# Patient Record
Sex: MALE | Race: BLACK OR AFRICAN AMERICAN | Employment: UNEMPLOYED | URBAN - NONMETROPOLITAN AREA
[De-identification: names, ages, dates, MRNs, and addresses within clinical notes are randomized per-mention and may not be internally consistent; named-entity substitution may affect disease eponyms.]

---

## 2020-05-17 ENCOUNTER — APPOINTMENT (OUTPATIENT)
Dept: GENERAL RADIOLOGY | Age: 30
DRG: 392 | End: 2020-05-17

## 2020-05-17 ENCOUNTER — HOSPITAL ENCOUNTER (INPATIENT)
Age: 30
LOS: 3 days | Discharge: HOME | DRG: 392 | End: 2020-05-20
Attending: EMERGENCY MEDICINE | Admitting: INTERNAL MEDICINE

## 2020-05-17 PROBLEM — R07.9 CHEST PAIN: Status: ACTIVE | Noted: 2020-05-17

## 2020-05-17 LAB
ALBUMIN SERPL-MCNC: 4.8 G/DL (ref 3.5–5.1)
ALP BLD-CCNC: 84 U/L (ref 38–126)
ALT SERPL-CCNC: 197 U/L (ref 11–66)
AMPHETAMINE+METHAMPHETAMINE URINE SCREEN: NEGATIVE
ANION GAP SERPL CALCULATED.3IONS-SCNC: 17 MEQ/L (ref 8–16)
AST SERPL-CCNC: 230 U/L (ref 5–40)
BACTERIA: ABNORMAL /HPF
BARBITURATE QUANTITATIVE URINE: NEGATIVE
BASOPHILS # BLD: 0.5 %
BASOPHILS ABSOLUTE: 0 THOU/MM3 (ref 0–0.1)
BENZODIAZEPINE QUANTITATIVE URINE: NEGATIVE
BILIRUB SERPL-MCNC: 0.6 MG/DL (ref 0.3–1.2)
BILIRUBIN DIRECT: < 0.2 MG/DL (ref 0–0.3)
BILIRUBIN URINE: ABNORMAL
BLOOD, URINE: ABNORMAL
BUN BLDV-MCNC: 8 MG/DL (ref 7–22)
CALCIUM SERPL-MCNC: 11.6 MG/DL (ref 8.5–10.5)
CANNABINOID QUANTITATIVE URINE: POSITIVE
CASTS 2: ABNORMAL /LPF
CASTS UA: ABNORMAL /LPF
CHARACTER, URINE: CLEAR
CHLORIDE BLD-SCNC: 92 MEQ/L (ref 98–111)
CO2: 24 MEQ/L (ref 23–33)
COCAINE METABOLITE QUANTITATIVE URINE: NEGATIVE
COLOR: ABNORMAL
CREAT SERPL-MCNC: 0.8 MG/DL (ref 0.4–1.2)
CRYSTALS, UA: ABNORMAL
D-DIMER QUANTITATIVE: 1006 NG/ML FEU (ref 0–500)
EKG ATRIAL RATE: 70 BPM
EKG ATRIAL RATE: 81 BPM
EKG ATRIAL RATE: 83 BPM
EKG P AXIS: 64 DEGREES
EKG P AXIS: 73 DEGREES
EKG P AXIS: 80 DEGREES
EKG P-R INTERVAL: 160 MS
EKG P-R INTERVAL: 162 MS
EKG P-R INTERVAL: 162 MS
EKG Q-T INTERVAL: 346 MS
EKG Q-T INTERVAL: 362 MS
EKG Q-T INTERVAL: 364 MS
EKG QRS DURATION: 74 MS
EKG QRS DURATION: 80 MS
EKG QRS DURATION: 80 MS
EKG QTC CALCULATION (BAZETT): 393 MS
EKG QTC CALCULATION (BAZETT): 401 MS
EKG QTC CALCULATION (BAZETT): 425 MS
EKG R AXIS: 25 DEGREES
EKG R AXIS: 41 DEGREES
EKG R AXIS: 46 DEGREES
EKG T AXIS: -3 DEGREES
EKG T AXIS: 21 DEGREES
EKG T AXIS: 6 DEGREES
EKG VENTRICULAR RATE: 70 BPM
EKG VENTRICULAR RATE: 81 BPM
EKG VENTRICULAR RATE: 83 BPM
EOSINOPHIL # BLD: 0.2 %
EOSINOPHILS ABSOLUTE: 0 THOU/MM3 (ref 0–0.4)
EPITHELIAL CELLS, UA: ABNORMAL /HPF
ERYTHROCYTE [DISTWIDTH] IN BLOOD BY AUTOMATED COUNT: 13 % (ref 11.5–14.5)
ERYTHROCYTE [DISTWIDTH] IN BLOOD BY AUTOMATED COUNT: 41.5 FL (ref 35–45)
GFR SERPL CREATININE-BSD FRML MDRD: > 90 ML/MIN/1.73M2
GLUCOSE BLD-MCNC: 99 MG/DL (ref 70–108)
GLUCOSE URINE: ABNORMAL MG/DL
HAV IGM SER IA-ACNC: NEGATIVE
HCT VFR BLD CALC: 39.7 % (ref 42–52)
HEMOGLOBIN: 13.1 GM/DL (ref 14–18)
HEPATITIS B CORE IGM ANTIBODY: NEGATIVE
HEPATITIS B SURFACE ANTIGEN: NEGATIVE
HEPATITIS C ANTIBODY: NEGATIVE
ICTOTEST: NEGATIVE
IMMATURE GRANS (ABS): 0.03 THOU/MM3 (ref 0–0.07)
IMMATURE GRANULOCYTES: 0.5 %
KETONES, URINE: ABNORMAL
LEUKOCYTE ESTERASE, URINE: ABNORMAL
LIPASE: 24.7 U/L (ref 5.6–51.3)
LYMPHOCYTES # BLD: 14.8 %
LYMPHOCYTES ABSOLUTE: 0.9 THOU/MM3 (ref 1–4.8)
MCH RBC QN AUTO: 29.2 PG (ref 26–33)
MCHC RBC AUTO-ENTMCNC: 33 GM/DL (ref 32.2–35.5)
MCV RBC AUTO: 88.6 FL (ref 80–94)
MISCELLANEOUS 2: ABNORMAL
MONOCYTES # BLD: 14.8 %
MONOCYTES ABSOLUTE: 0.9 THOU/MM3 (ref 0.4–1.3)
NITRITE, URINE: ABNORMAL
NUCLEATED RED BLOOD CELLS: 0 /100 WBC
OPIATES, URINE: NEGATIVE
OSMOLALITY CALCULATION: 264.7 MOSMOL/KG (ref 275–300)
OXYCODONE: NEGATIVE
PH UA: ABNORMAL (ref 5–9)
PHENCYCLIDINE QUANTITATIVE URINE: NEGATIVE
PLATELET # BLD: 112 THOU/MM3 (ref 130–400)
PMV BLD AUTO: 10.7 FL (ref 9.4–12.4)
POTASSIUM REFLEX MAGNESIUM: 4 MEQ/L (ref 3.5–5.2)
PRO-BNP: 17.7 PG/ML (ref 0–450)
PROTEIN UA: ABNORMAL
RBC # BLD: 4.48 MILL/MM3 (ref 4.7–6.1)
RBC URINE: ABNORMAL /HPF
RENAL EPITHELIAL, UA: ABNORMAL
SEG NEUTROPHILS: 69.2 %
SEGMENTED NEUTROPHILS ABSOLUTE COUNT: 4 THOU/MM3 (ref 1.8–7.7)
SODIUM BLD-SCNC: 133 MEQ/L (ref 135–145)
SPECIFIC GRAVITY, URINE: ABNORMAL (ref 1–1.03)
TOTAL PROTEIN: 8.2 G/DL (ref 6.1–8)
TROPONIN T: < 0.01 NG/ML
UROBILINOGEN, URINE: ABNORMAL EU/DL (ref 0–1)
WBC # BLD: 5.8 THOU/MM3 (ref 4.8–10.8)
WBC UA: ABNORMAL /HPF
YEAST: ABNORMAL

## 2020-05-17 PROCEDURE — B2111ZZ FLUOROSCOPY OF MULTIPLE CORONARY ARTERIES USING LOW OSMOLAR CONTRAST: ICD-10-PCS | Performed by: INTERNAL MEDICINE

## 2020-05-17 PROCEDURE — 85025 COMPLETE CBC W/AUTO DIFF WBC: CPT

## 2020-05-17 PROCEDURE — 6370000000 HC RX 637 (ALT 250 FOR IP): Performed by: EMERGENCY MEDICINE

## 2020-05-17 PROCEDURE — 99283 EMERGENCY DEPT VISIT LOW MDM: CPT

## 2020-05-17 PROCEDURE — 36000 PLACE NEEDLE IN VEIN: CPT

## 2020-05-17 PROCEDURE — 93010 ELECTROCARDIOGRAM REPORT: CPT | Performed by: INTERNAL MEDICINE

## 2020-05-17 PROCEDURE — 83690 ASSAY OF LIPASE: CPT

## 2020-05-17 PROCEDURE — 4A023N7 MEASUREMENT OF CARDIAC SAMPLING AND PRESSURE, LEFT HEART, PERCUTANEOUS APPROACH: ICD-10-PCS | Performed by: INTERNAL MEDICINE

## 2020-05-17 PROCEDURE — 80074 ACUTE HEPATITIS PANEL: CPT

## 2020-05-17 PROCEDURE — 80048 BASIC METABOLIC PNL TOTAL CA: CPT

## 2020-05-17 PROCEDURE — 93458 L HRT ARTERY/VENTRICLE ANGIO: CPT

## 2020-05-17 PROCEDURE — 1200000003 HC TELEMETRY R&B

## 2020-05-17 PROCEDURE — 36415 COLL VENOUS BLD VENIPUNCTURE: CPT

## 2020-05-17 PROCEDURE — B2151ZZ FLUOROSCOPY OF LEFT HEART USING LOW OSMOLAR CONTRAST: ICD-10-PCS | Performed by: INTERNAL MEDICINE

## 2020-05-17 PROCEDURE — 2500000003 HC RX 250 WO HCPCS

## 2020-05-17 PROCEDURE — 93005 ELECTROCARDIOGRAM TRACING: CPT | Performed by: EMERGENCY MEDICINE

## 2020-05-17 PROCEDURE — 6360000004 HC RX CONTRAST MEDICATION: Performed by: INTERNAL MEDICINE

## 2020-05-17 PROCEDURE — 93458 L HRT ARTERY/VENTRICLE ANGIO: CPT | Performed by: INTERNAL MEDICINE

## 2020-05-17 PROCEDURE — 2580000003 HC RX 258: Performed by: INTERNAL MEDICINE

## 2020-05-17 PROCEDURE — 80307 DRUG TEST PRSMV CHEM ANLYZR: CPT

## 2020-05-17 PROCEDURE — 99223 1ST HOSP IP/OBS HIGH 75: CPT | Performed by: INTERNAL MEDICINE

## 2020-05-17 PROCEDURE — 81001 URINALYSIS AUTO W/SCOPE: CPT

## 2020-05-17 PROCEDURE — 99205 OFFICE O/P NEW HI 60 MIN: CPT

## 2020-05-17 PROCEDURE — 2709999900 HC NON-CHARGEABLE SUPPLY

## 2020-05-17 PROCEDURE — 83880 ASSAY OF NATRIURETIC PEPTIDE: CPT

## 2020-05-17 PROCEDURE — 6360000002 HC RX W HCPCS

## 2020-05-17 PROCEDURE — 85379 FIBRIN DEGRADATION QUANT: CPT

## 2020-05-17 PROCEDURE — 6370000000 HC RX 637 (ALT 250 FOR IP): Performed by: NURSE PRACTITIONER

## 2020-05-17 PROCEDURE — C1894 INTRO/SHEATH, NON-LASER: HCPCS

## 2020-05-17 PROCEDURE — 80076 HEPATIC FUNCTION PANEL: CPT

## 2020-05-17 PROCEDURE — 93005 ELECTROCARDIOGRAM TRACING: CPT | Performed by: INTERNAL MEDICINE

## 2020-05-17 PROCEDURE — 71045 X-RAY EXAM CHEST 1 VIEW: CPT

## 2020-05-17 PROCEDURE — 93005 ELECTROCARDIOGRAM TRACING: CPT | Performed by: NURSE PRACTITIONER

## 2020-05-17 PROCEDURE — 84484 ASSAY OF TROPONIN QUANT: CPT

## 2020-05-17 PROCEDURE — C1769 GUIDE WIRE: HCPCS

## 2020-05-17 PROCEDURE — 6370000000 HC RX 637 (ALT 250 FOR IP): Performed by: INTERNAL MEDICINE

## 2020-05-17 RX ORDER — SODIUM CHLORIDE 0.9 % (FLUSH) 0.9 %
10 SYRINGE (ML) INJECTION EVERY 12 HOURS SCHEDULED
Status: DISCONTINUED | OUTPATIENT
Start: 2020-05-17 | End: 2020-05-20 | Stop reason: HOSPADM

## 2020-05-17 RX ORDER — PANTOPRAZOLE SODIUM 40 MG/1
40 TABLET, DELAYED RELEASE ORAL
Status: DISCONTINUED | OUTPATIENT
Start: 2020-05-17 | End: 2020-05-20 | Stop reason: HOSPADM

## 2020-05-17 RX ORDER — SODIUM CHLORIDE 9 MG/ML
INJECTION, SOLUTION INTRAVENOUS CONTINUOUS
Status: DISCONTINUED | OUTPATIENT
Start: 2020-05-17 | End: 2020-05-18

## 2020-05-17 RX ORDER — SODIUM CHLORIDE 0.9 % (FLUSH) 0.9 %
10 SYRINGE (ML) INJECTION PRN
Status: DISCONTINUED | OUTPATIENT
Start: 2020-05-17 | End: 2020-05-20 | Stop reason: HOSPADM

## 2020-05-17 RX ORDER — ACETAMINOPHEN 325 MG/1
650 TABLET ORAL EVERY 4 HOURS PRN
Status: DISCONTINUED | OUTPATIENT
Start: 2020-05-17 | End: 2020-05-20 | Stop reason: HOSPADM

## 2020-05-17 RX ORDER — ASPIRIN 81 MG/1
324 TABLET, CHEWABLE ORAL ONCE
Status: COMPLETED | OUTPATIENT
Start: 2020-05-17 | End: 2020-05-17

## 2020-05-17 RX ORDER — ASPIRIN 81 MG/1
81 TABLET ORAL ONCE
Status: COMPLETED | OUTPATIENT
Start: 2020-05-17 | End: 2020-05-17

## 2020-05-17 RX ADMIN — PANTOPRAZOLE SODIUM 40 MG: 40 TABLET, DELAYED RELEASE ORAL at 17:11

## 2020-05-17 RX ADMIN — LIDOCAINE HYDROCHLORIDE: 20 SOLUTION ORAL; TOPICAL at 14:29

## 2020-05-17 RX ADMIN — ASPIRIN 81 MG 162 MG: 81 TABLET ORAL at 13:43

## 2020-05-17 RX ADMIN — IOPAMIDOL 60 ML: 755 INJECTION, SOLUTION INTRAVENOUS at 15:36

## 2020-05-17 RX ADMIN — ASPIRIN 81 MG: 81 TABLET ORAL at 17:11

## 2020-05-17 RX ADMIN — Medication 10 ML: at 20:44

## 2020-05-17 RX ADMIN — SODIUM CHLORIDE: 9 INJECTION, SOLUTION INTRAVENOUS at 17:11

## 2020-05-17 RX ADMIN — ACETAMINOPHEN 650 MG: 325 TABLET ORAL at 20:44

## 2020-05-17 ASSESSMENT — ENCOUNTER SYMPTOMS
EYE REDNESS: 0
EYE PAIN: 0
NAUSEA: 0
ROS SKIN COMMENTS: DIAPHORETIC
CHEST TIGHTNESS: 0
SORE THROAT: 0
RHINORRHEA: 0
ABDOMINAL PAIN: 0
DIARRHEA: 0
SHORTNESS OF BREATH: 0
VOICE CHANGE: 0
FACIAL SWELLING: 0
ALLERGIC/IMMUNOLOGIC NEGATIVE: 1
COLOR CHANGE: 0
PHOTOPHOBIA: 0
SINUS PRESSURE: 0
VOMITING: 0
COUGH: 0
BACK PAIN: 0
STRIDOR: 0
SHORTNESS OF BREATH: 1
SINUS PAIN: 0
WHEEZING: 0

## 2020-05-17 ASSESSMENT — PAIN DESCRIPTION - LOCATION
LOCATION: CHEST

## 2020-05-17 ASSESSMENT — PAIN SCALES - GENERAL
PAINLEVEL_OUTOF10: 0
PAINLEVEL_OUTOF10: 5
PAINLEVEL_OUTOF10: 4

## 2020-05-17 ASSESSMENT — PAIN DESCRIPTION - PAIN TYPE
TYPE: ACUTE PAIN

## 2020-05-17 ASSESSMENT — PAIN DESCRIPTION - FREQUENCY
FREQUENCY: INTERMITTENT
FREQUENCY: INTERMITTENT

## 2020-05-17 NOTE — ED PROVIDER NOTES
TempSrc:  Oral  Oral   SpO2: 97% 98%  98%   Weight:   175 lb (79.4 kg)    Height:   6' 4\" (1.93 m)        Medications   sodium chloride flush 0.9 % injection 10 mL (has no administration in time range)   sodium chloride flush 0.9 % injection 10 mL (has no administration in time range)   acetaminophen (TYLENOL) tablet 650 mg (has no administration in time range)   magnesium hydroxide (MILK OF MAGNESIA) 400 MG/5ML suspension 30 mL (has no administration in time range)   0.9 % sodium chloride infusion ( Intravenous New Bag 5/17/20 1711)   pantoprazole (PROTONIX) tablet 40 mg (40 mg Oral Given 5/17/20 1711)   aspirin chewable tablet 324 mg (162 mg Oral Given 5/17/20 1343)   aluminum & magnesium hydroxide-simethicone (MAALOX) 30 mL, lidocaine viscous hcl (XYLOCAINE) 5 mL (GI COCKTAIL) ( Oral Given 5/17/20 1429)   iopamidol (ISOVUE-370) 76 % injection 60 mL (60 mLs Intravenous Given 5/17/20 1536)   aspirin EC tablet 81 mg (81 mg Oral Given 5/17/20 1711)   given 2 tabs during urgent care 162 mg   PROCEDURES:  FINALIMPRESSION      1. Chest pain, unspecified type    2. EKG abnormality    3. Elevated blood pressure reading        DISPOSITION/PLAN   DISPOSITION Admitted 05/17/2020 04:06:29 PM   Faxed 12 lead EKG to [facility], notified slight elevation in leads V3, possible V4. Pt is symptomatic. Started chest pain protocol, pulse ox 97%, room air, no oxygen applied at this time. [facility] called for chest pain for transfer. Patient is currently having chest pressure left-sided chest onset was 2 days ago the pain has been intermittent. The pain is mild 2 out of 10. He denies any shortness of breath, dizziness, headache, visual disturbance, nausea or vomiting, abdominal pain or back pain. No recent injury. EKG shows sinus rhythm with marked sinus arrhythmia possible left atrial enlargement and septal infarct, age undetermined. There is slight elevation in leads V3 and V4.   Further diagnostic work-up is needed that we are not able to do in the urgent care setting. Higher level of care needed. Spoke to Dr. José Antonio Bonilla at [address] [place] [address] ER faxed over EKG for review. After reviewing the patients History of Present illness, Vital Signs,Clinical Findings,Comorbities, and Clinical Data obtained I discussed with the patient or patient representative that there is a very real potential for serious injury / illness and the patient will need to be transfer to a level of higher care for further evaluation and continued care. It was explained that this would provide the best care for the patient. The patient/patient representative are agreeable to the treatment plan and are agreeable to be transferred therefore, the patient will be transferred to [facility] ER for reevaluation and further management . Report was called to the accepting facility and given to Dr. José Antonio Bonilla who accepted the patients transfer. Patient was transferred from the Urgent Care in stable condition by [facility] . ED Course as of May 17 1759   Sun May 17, 2020   1422 Concave 2mm anterior, appears early repol   EKG 12 Lead [DD]   1438 Dr. Valdemar Dolan at bedside as STEMI alert was called per inbound providers.  States eqivocal ECG and states will take to cath lab    [DD]      ED Course User Index  [DD] Stephany Davison DO       Problem List Items Addressed This Visit     Chest pain - Primary      Other Visit Diagnoses     EKG abnormality        Elevated blood pressure reading              PATIENT REFERRED TO:  [facility] ER  [address] [address]  Go to   go directly over for chest pain via [address], APRN - CNP  05/17/20 [address], APRN - CNP  05/17/20 1800

## 2020-05-17 NOTE — FLOWSHEET NOTE
05/17/20 1426   Encounter Summary   Services provided to: Patient   Referral/Consult From: Nurse   Support System Family members   Place [facility] none   Continue Visiting Yes  (5/17/20 continue support)   Complexity of Encounter Moderate   Length of Encounter 30 minutes   Spiritual Assessment Completed Yes   Crisis   Type Stemi Alert   Assessment Approachable;Calm;Coping   Intervention Active listening;Nurtured hope;Discussed illness/injury and it's impact   Outcome Expressed gratitude;Engaged in conversation   Spiritual/Synagogue   Type Spiritual support     Pt is a 26year old male. Patient is resting in bed awake and engaged in conversation during this visit. Patient shared that he was experiencing chest pain and restlessness at home. Patient also shared with me that he has not experienced this kind of chest pain before. Also during this visit, patient shared that he has has not been able to sleep well for several days. Assessment:  Patient appeared calm and alert. Patient has no family with him. Patient is approachable end engaged in conversation. Patient has no [employer]an affiliation and is receptive to 's visit. Plan of Care:   will follow up with patient in a day to talk about patient source of meanings and purpose in life  Secondly,  will engaged patient to learn further details about patient's tony and learn how patient's tony is playing a role through his health care needs.  SC service remain available to patient while patient is receiving care here at [address] [place] [address]

## 2020-05-17 NOTE — ED TRIAGE NOTES
Patient states he has left sided stabbing chest pain. Patient has had pain come and go for 3 days. Patient states he is very sweaty. Patient states he had a brother at 22 have a heart attack.

## 2020-05-17 NOTE — ED NOTES
Upon arrival, pt is A&O x4. Pt currently states pain 4/10 in middle-left side of chest. Pt states pains started 3-4 days ago and progressively got worse. Describes pain as \"crampy and stingy. \" Pt states that he also is having episodes of heartburn, mainly after eating. Pt states he also has had chills, SOB with pain exacerbation, but no reports of fever over last few days. No changes in bowel and bladder habits    Pt states he was given aspirin at urgent care and it helped diminish pain. EMS states pt was given 0.4 mg on nitro enroute that decreased pain from 6 to 4/10.      Pt has a hx of MI in family      Lourena Simmonds, SUSANNE  05/17/20 5076

## 2020-05-17 NOTE — ED NOTES
Patient left with [facility] in ambulance to go directly to [facility] ER. Patient had no other needs.       Addison Holliday RN  05/17/20 8074

## 2020-05-17 NOTE — ED PROVIDER NOTES
urgency. Musculoskeletal: Negative for arthralgias, back pain, myalgias and neck pain. Skin: Negative for color change and rash. Neurological: Negative for weakness and headaches. Hematological: Negative for adenopathy. Does not bruise/bleed easily. Psychiatric/Behavioral: Negative for confusion and self-injury. PAST MEDICAL HISTORY    has no past medical history on file. SURGICAL HISTORY      has no past surgical history on file. CURRENT MEDICATIONS       Previous Medications    ASPIRIN 81 MG TABLET    Take 162 mg by mouth daily Patient took this morning       ALLERGIES     has No Known Allergies. FAMILY HISTORY     He indicated that the status of his brother is unknown.   family history includes Heart Attack in his brother. SOCIAL HISTORY      reports that he has been smoking cigarettes. He has been smoking about 0.50 packs per day. He has never used smokeless tobacco. He reports current alcohol use. He reports that he does not use drugs. PHYSICAL EXAM     INITIAL VITALS:  height is 6' 4\" (1.93 m) and weight is 175 lb (79.4 kg). His temperature is 97.7 °F (36.5 °C). His blood pressure is 158/101 (abnormal) and his pulse is 95. His respiration is 18 and oxygen saturation is 97%. Physical Exam  Vitals signs and nursing note reviewed. Constitutional:       General: He is not in acute distress. Appearance: He is well-developed. He is not diaphoretic. HENT:      Head: Normocephalic and atraumatic. Eyes:      Conjunctiva/sclera: Conjunctivae normal.   Neck:      Musculoskeletal: Normal range of motion and neck supple. Thyroid: No thyromegaly. Cardiovascular:      Rate and Rhythm: Normal rate and regular rhythm. Heart sounds: Normal heart sounds. No murmur. Pulmonary:      Effort: Pulmonary effort is normal. No respiratory distress. Breath sounds: Normal breath sounds. No decreased breath sounds, wheezing or rhonchi. Chest:      Chest wall: No tenderness. Abdominal:      General: Bowel sounds are normal. There is no distension. Palpations: Abdomen is soft. Tenderness: There is abdominal tenderness (epigastric). Musculoskeletal: Normal range of motion. Skin:     General: Skin is warm and dry. Coloration: Skin is not pale. Findings: No erythema or rash. Neurological:      Mental Status: He is alert and oriented to person, place, and time. Psychiatric:         Behavior: Behavior normal.         Thought Content: Thought content normal.         Judgment: Judgment normal.         DIFFERENTIALDIAGNOSIS:   GI related, ? ETOH pancreatitis, hepatitis, ECG     DIAGNOSTIC RESULTS     EKG: All EKG's are interpreted by the Emergency Department Physician who either signs or Co-signs this chart in the absence of a cardiologist.  EKG interpreted by Breanna Vick DO:    Hilda. Rate: 83 bpm  WA interval: 162 ms  QRS duration: 80 ms  QTc: 425 ms  P-R-T axes: 80, 41, -3  Sinus rhythm with marked sinus arrhythmia  Possible Left atrial enlargement  Septal infarct , age undetermined  No STEMI  Early repolarization and anterior lateral leads T wave inversion isolated lead 3    RADIOLOGY: non-plain film images(s) such as CT, Ultrasound and MRI are read by the radiologist.    XR CHEST PORTABLE   Final Result   1. Unremarkable AP portable chest radiograph. **This report has been created using voice recognition software. It may contain minor errors which are inherent in voice recognition technology. **      Final report electronically signed by Dr. Chari Martinez on 5/17/2020 2:46 PM          LABS:   Labs Reviewed - No data to display    EMERGENCYDEPARTMENT COURSE:   Vitals:    Vitals:    05/17/20 1333 05/17/20 1350   BP: (!) 189/111 (!) 158/101   Pulse: 112 95   Resp: 18 18   Temp: 97.7 °F (36.5 °C)    SpO2: 97% 97%   Weight: 175 lb (79.4 kg)    Height: 6' 4\" (1.93 m)        2:15 PM EDT: The patient was seen and evaluated.     2:18 PM EDT: Spoke with Cardiology about the patient. ED Course as of May 17 1811   Sun May 17, 2020   1422 Concave 2mm anterior, appears early repol   EKG 12 Lead [DD]   1438 Dr. Susi Lopez at bedside as STEMI alert was called per inbound providers. States eqivocal ECG and states will take to cath lab    [DD]      ED Course User Index  [DD] Zahra Goff DO         MDM:  68-year-old male with risk factors for coronary artery disease smoking, family history of early MI in his brother. Patient's pain? Cardiac versus GI given reported alcohol use. Patient does not appear in acute distress. PERC scores negative. Abdomen is soft with minimal epigastric tenderness. The report was called and inbound and then cancelled per my review. Cardiology was at bedside and reviewed ECG and patient history. Given Risk factors and equivocal ECG- Patient was then taken to Cath lab. CRITICAL CARE:   None    CONSULTS:  Cardiology     PROCEDURES:  None     FINAL IMPRESSION      1. Chest pain, unspecified type    2. EKG abnormality    3. Elevated blood pressure reading          DISPOSITION/PLAN   admit    PATIENT REFERRED TO:  [facility]  [address] [facility] [address]  Go to   go directly over for chest pain via [address] [facility]:  New Prescriptions    No medications on file       (Please note that portions of this note were completed with a voice recognition program.  Efforts were made to edit the dictations but occasionally words aremis-transcribed.)    Scribe:  Shaheen Davidson 5/17/20 2:18 PM EDT Scribing for and in the presence of Zahra Goff DO. Signed by: Malinda Marquez, 05/17/20 2:18 PM    Provider:  I personally performed theservices described in the documentation, reviewed and edited the documentation which was dictated to the scribe in my presence, and it accurately records my words and actions.     Zahra Goff DO 5/17/20 2:18 PM        Zahra Goff DO  05/17/20 1816

## 2020-05-17 NOTE — ED NOTES
Patient already had 2 mike asa this am so 2 more were given per AHA protocol     Shannon Pichardo, RN  05/17/20 0881

## 2020-05-17 NOTE — PRE SEDATION
questions and wished to proceed; the consent form was signed. MEDICAL HISTORY  []ASHD/ANGINA/MI/CHF   []Hypertension  []Diabetes  []Hyperlipidemia  []Smoking  []Family Hx of ASHD  []Additional information:       has no past medical history on file. SURGICAL HISTORY   has no past surgical history on file. Additional information:       ALLERGIES   Allergies as of 05/17/2020    (No Known Allergies)     Additional information:       MEDICATIONS   Aspirin  [x] 81 mg  [] 325 mg  [] None  Antiplatelet drug therapy use last 5 days  [x]No []Yes  Coumadin Use Last 5 Days [x]No []Yes  Other anticoagulant use last 5 days  [x]No []Yes  No current facility-administered medications for this encounter. Current Outpatient Medications:     aspirin 81 MG tablet, Take 162 mg by mouth daily Patient took this morning, Disp: , Rfl:   Prior to Admission medications    Medication Sig Start Date End Date Taking? Authorizing Provider   aspirin 81 MG tablet Take 162 mg by mouth daily Patient took this morning   Yes Historical Provider, MD     Additional information:       VITAL SIGNS   Vitals:    05/17/20 1350   BP: (!) 158/101   Pulse: 95   Resp: 18   Temp:    SpO2: 97%       PHYSICAL:   General: No acute distress  HEENT:  Unremarkable for age  Neck: without increased JVD, carotid pulses 2+ bilaterally without bruits  Heart: RRR, S1 & S2 WNL.  No murmurs   NYHA: IV   Lungs: Clear to auscultation    Abdomen: BS present, without HSM, masses, or tenderness    Extremities: without C,C,E.  Pulses 2+ bilaterally   Mental Status: Alert & Oriented        PLANNED PROCEDURE   [x]Cath  [x]PCI                []Pacemaker/AICD  []AIDA             []Cardioversion []Peripheral angiography/PTA  []Other:      SEDATION  Planned agent:[]Midazolam []Meperidine []Sublimaze []Morphine  []Diazepam  []Other:     ASA Classification:  []1 []2 [x]3 []4 []5   Class 1: A normal healthy patient  Class 2: Pt with mild to moderate systemic disease  Class 3:

## 2020-05-17 NOTE — H&P
lymphadenopathy  Pulmonary/Chest: clear to auscultation bilaterally- no wheezes, rales or rhonchi, normal air movement, no respiratory distress  Cardiovascular: normal rate, regular rhythm, normal S1 and S2, no murmur. No rubs, clicks, or gallops, distal pulses intact, no carotid bruits, Negative JVD  Radial Pulses: intact 2+  Abdomen: soft, non-tender, non-distended, normal bowel sounds, no masses or organomegaly  Extremities: no cyanosis, clubbing . no Edema  Musculoskeletal: normal range of motion, no joint swelling, deformity or tenderness      RADIOLOGY   No results found. LABS:  No results for input(s): CKTOTAL, CKMB, CKMBINDEX, TROPONINT in the last 72 hours. CBC: No results found for: WBC, RBC, HGB, HCT, MCV, MCH, MCHC, RDW, PLT, MPV  BMP:  No results found for: NA, K, CL, CO2, BUN, LABALBU, CREATININE, CALCIUM, GFRAA, LABGLOM, GLUCOSE  Hepatic Function Panel:  No results found for: ALKPHOS, ALT, AST, PROT, BILITOT, BILIDIR, IBILI, LABALBU  Magnesium:  No results found for: MG  Warfarin PT/INR:  No components found for: PTPATWAR, PTINRWAR  HgBA1c:  No results found for: LABA1C  FLP:  No results found for: TRIG, HDL, LDLCALC, LDLDIRECT, LABVLDL  TSH:  No results found for: TSH  BNP: No results found for: BNP      ASSESSMENT:  Danielle Prescott is a pleasant 26year old female patient with past medical history that includes tobacco abuse. The patient denies h/o drug abuse. He has a significant family history of premature CAD, his both had an MI at age 22. The patient has been experiencing an intermittent chest pain and \"heartburn\" for the past 2-4 days. Today, the pain have worsened and is described by patient as pressure like, 7/10, retrosternal, nonraditing with associated shortness of breath, excessive sweating and mild nausea. He denies fever, coughing, runny nose or chills. He presented to [facility] with those complaints where EKG revealed ST elevations and STEMI alert was activated.  He was transferred to [address] [place] [address]

## 2020-05-18 ENCOUNTER — APPOINTMENT (OUTPATIENT)
Dept: CT IMAGING | Age: 30
DRG: 392 | End: 2020-05-18

## 2020-05-18 ENCOUNTER — APPOINTMENT (OUTPATIENT)
Dept: ULTRASOUND IMAGING | Age: 30
DRG: 392 | End: 2020-05-18

## 2020-05-18 PROBLEM — R56.9 NEW ONSET SEIZURE (HCC): Status: ACTIVE | Noted: 2020-05-18

## 2020-05-18 LAB
ANION GAP SERPL CALCULATED.3IONS-SCNC: 11 MEQ/L (ref 8–16)
ANION GAP SERPL CALCULATED.3IONS-SCNC: 12 MEQ/L (ref 8–16)
BILIRUBIN URINE: NEGATIVE
BLOOD, URINE: NEGATIVE
BUN BLDV-MCNC: 6 MG/DL (ref 7–22)
BUN BLDV-MCNC: 8 MG/DL (ref 7–22)
C-REACTIVE PROTEIN: 0.34 MG/DL (ref 0–1)
CALCIUM SERPL-MCNC: 9 MG/DL (ref 8.5–10.5)
CALCIUM SERPL-MCNC: 9.1 MG/DL (ref 8.5–10.5)
CHARACTER, URINE: CLEAR
CHLORIDE BLD-SCNC: 100 MEQ/L (ref 98–111)
CHLORIDE BLD-SCNC: 94 MEQ/L (ref 98–111)
CHOLESTEROL, TOTAL: 225 MG/DL (ref 100–199)
CO2: 26 MEQ/L (ref 23–33)
CO2: 26 MEQ/L (ref 23–33)
COLOR: YELLOW
CREAT SERPL-MCNC: 0.9 MG/DL (ref 0.4–1.2)
CREAT SERPL-MCNC: 0.9 MG/DL (ref 0.4–1.2)
EKG ATRIAL RATE: 101 BPM
EKG ATRIAL RATE: 67 BPM
EKG P AXIS: 51 DEGREES
EKG P AXIS: 67 DEGREES
EKG P-R INTERVAL: 166 MS
EKG P-R INTERVAL: 166 MS
EKG Q-T INTERVAL: 370 MS
EKG Q-T INTERVAL: 402 MS
EKG QRS DURATION: 84 MS
EKG QRS DURATION: 90 MS
EKG QTC CALCULATION (BAZETT): 424 MS
EKG QTC CALCULATION (BAZETT): 479 MS
EKG R AXIS: 29 DEGREES
EKG R AXIS: 74 DEGREES
EKG T AXIS: -5 DEGREES
EKG T AXIS: 16 DEGREES
EKG VENTRICULAR RATE: 101 BPM
EKG VENTRICULAR RATE: 67 BPM
ERYTHROCYTE [DISTWIDTH] IN BLOOD BY AUTOMATED COUNT: 12.9 % (ref 11.5–14.5)
ERYTHROCYTE [DISTWIDTH] IN BLOOD BY AUTOMATED COUNT: 42.1 FL (ref 35–45)
GFR SERPL CREATININE-BSD FRML MDRD: > 90 ML/MIN/1.73M2
GFR SERPL CREATININE-BSD FRML MDRD: > 90 ML/MIN/1.73M2
GLUCOSE BLD-MCNC: 101 MG/DL (ref 70–108)
GLUCOSE BLD-MCNC: 111 MG/DL (ref 70–108)
GLUCOSE BLD-MCNC: 140 MG/DL (ref 70–108)
GLUCOSE, URINE: NEGATIVE MG/DL
HCT VFR BLD CALC: 36.2 % (ref 42–52)
HDLC SERPL-MCNC: 46 MG/DL
HEMOGLOBIN: 12 GM/DL (ref 14–18)
KETONES, URINE: NEGATIVE
LDL CHOLESTEROL CALCULATED: 158 MG/DL
LEUKOCYTE EST, POC: NEGATIVE
LV EF: 53 %
LVEF MODALITY: NORMAL
MAGNESIUM: 1.9 MG/DL (ref 1.6–2.4)
MCH RBC QN AUTO: 29.7 PG (ref 26–33)
MCHC RBC AUTO-ENTMCNC: 33.1 GM/DL (ref 32.2–35.5)
MCV RBC AUTO: 89.6 FL (ref 80–94)
NITRITE, URINE: NEGATIVE
PH UA: 8.5 (ref 5–9)
PHOSPHORUS: 1.9 MG/DL (ref 2.4–4.7)
PLATELET # BLD: 101 THOU/MM3 (ref 130–400)
PMV BLD AUTO: 11 FL (ref 9.4–12.4)
POTASSIUM SERPL-SCNC: 3.6 MEQ/L (ref 3.5–5.2)
POTASSIUM SERPL-SCNC: 4 MEQ/L (ref 3.5–5.2)
PROLACTIN: 21.2 NG/ML
PROTEIN UA: NEGATIVE MG/DL
RBC # BLD: 4.04 MILL/MM3 (ref 4.7–6.1)
SEDIMENTATION RATE, ERYTHROCYTE: 2 MM/HR (ref 0–10)
SODIUM BLD-SCNC: 132 MEQ/L (ref 135–145)
SODIUM BLD-SCNC: 137 MEQ/L (ref 135–145)
SPECIFIC GRAVITY UA: 1.01 (ref 1–1.03)
TOTAL CK: 247 U/L (ref 55–170)
TRIGL SERPL-MCNC: 107 MG/DL (ref 0–199)
UROBILINOGEN, URINE: 0.2 EU/DL (ref 0–1)
WBC # BLD: 4.1 THOU/MM3 (ref 4.8–10.8)

## 2020-05-18 PROCEDURE — 86140 C-REACTIVE PROTEIN: CPT

## 2020-05-18 PROCEDURE — 6370000000 HC RX 637 (ALT 250 FOR IP): Performed by: INTERNAL MEDICINE

## 2020-05-18 PROCEDURE — 93010 ELECTROCARDIOGRAM REPORT: CPT | Performed by: INTERNAL MEDICINE

## 2020-05-18 PROCEDURE — 6370000000 HC RX 637 (ALT 250 FOR IP): Performed by: STUDENT IN AN ORGANIZED HEALTH CARE EDUCATION/TRAINING PROGRAM

## 2020-05-18 PROCEDURE — 80048 BASIC METABOLIC PNL TOTAL CA: CPT

## 2020-05-18 PROCEDURE — 99232 SBSQ HOSP IP/OBS MODERATE 35: CPT | Performed by: NURSE PRACTITIONER

## 2020-05-18 PROCEDURE — 93306 TTE W/DOPPLER COMPLETE: CPT

## 2020-05-18 PROCEDURE — 1200000003 HC TELEMETRY R&B

## 2020-05-18 PROCEDURE — 2580000003 HC RX 258: Performed by: INTERNAL MEDICINE

## 2020-05-18 PROCEDURE — 84100 ASSAY OF PHOSPHORUS: CPT

## 2020-05-18 PROCEDURE — 2580000003 HC RX 258: Performed by: STUDENT IN AN ORGANIZED HEALTH CARE EDUCATION/TRAINING PROGRAM

## 2020-05-18 PROCEDURE — 93005 ELECTROCARDIOGRAM TRACING: CPT | Performed by: NURSE PRACTITIONER

## 2020-05-18 PROCEDURE — 82550 ASSAY OF CK (CPK): CPT

## 2020-05-18 PROCEDURE — 80061 LIPID PANEL: CPT

## 2020-05-18 PROCEDURE — 82948 REAGENT STRIP/BLOOD GLUCOSE: CPT

## 2020-05-18 PROCEDURE — 76705 ECHO EXAM OF ABDOMEN: CPT

## 2020-05-18 PROCEDURE — 85027 COMPLETE CBC AUTOMATED: CPT

## 2020-05-18 PROCEDURE — 85651 RBC SED RATE NONAUTOMATED: CPT

## 2020-05-18 PROCEDURE — 36415 COLL VENOUS BLD VENIPUNCTURE: CPT

## 2020-05-18 PROCEDURE — 84146 ASSAY OF PROLACTIN: CPT

## 2020-05-18 PROCEDURE — 6360000002 HC RX W HCPCS

## 2020-05-18 PROCEDURE — 70450 CT HEAD/BRAIN W/O DYE: CPT

## 2020-05-18 PROCEDURE — 94760 N-INVAS EAR/PLS OXIMETRY 1: CPT

## 2020-05-18 PROCEDURE — 93005 ELECTROCARDIOGRAM TRACING: CPT | Performed by: INTERNAL MEDICINE

## 2020-05-18 PROCEDURE — 83735 ASSAY OF MAGNESIUM: CPT

## 2020-05-18 PROCEDURE — 81003 URINALYSIS AUTO W/O SCOPE: CPT

## 2020-05-18 RX ORDER — MULTIVITAMIN WITH IRON
1 TABLET ORAL DAILY
Status: DISCONTINUED | OUTPATIENT
Start: 2020-05-18 | End: 2020-05-20 | Stop reason: HOSPADM

## 2020-05-18 RX ORDER — SODIUM CHLORIDE 0.9 % (FLUSH) 0.9 %
10 SYRINGE (ML) INJECTION PRN
Status: DISCONTINUED | OUTPATIENT
Start: 2020-05-18 | End: 2020-05-18 | Stop reason: SDUPTHER

## 2020-05-18 RX ORDER — LORAZEPAM 2 MG/1
2 TABLET ORAL
Status: DISCONTINUED | OUTPATIENT
Start: 2020-05-18 | End: 2020-05-20 | Stop reason: HOSPADM

## 2020-05-18 RX ORDER — LORAZEPAM 2 MG/ML
3 INJECTION INTRAMUSCULAR
Status: DISCONTINUED | OUTPATIENT
Start: 2020-05-18 | End: 2020-05-20 | Stop reason: HOSPADM

## 2020-05-18 RX ORDER — LORAZEPAM 2 MG/ML
INJECTION INTRAMUSCULAR
Status: COMPLETED
Start: 2020-05-18 | End: 2020-05-18

## 2020-05-18 RX ORDER — LORAZEPAM 2 MG/ML
1 INJECTION INTRAMUSCULAR ONCE
Status: COMPLETED | OUTPATIENT
Start: 2020-05-18 | End: 2020-05-18

## 2020-05-18 RX ORDER — LORAZEPAM 2 MG/ML
4 INJECTION INTRAMUSCULAR
Status: DISCONTINUED | OUTPATIENT
Start: 2020-05-18 | End: 2020-05-20 | Stop reason: HOSPADM

## 2020-05-18 RX ORDER — THIAMINE MONONITRATE (VIT B1) 100 MG
100 TABLET ORAL DAILY
Status: DISCONTINUED | OUTPATIENT
Start: 2020-05-18 | End: 2020-05-20 | Stop reason: HOSPADM

## 2020-05-18 RX ORDER — LORAZEPAM 1 MG/1
1 TABLET ORAL
Status: DISCONTINUED | OUTPATIENT
Start: 2020-05-18 | End: 2020-05-20 | Stop reason: HOSPADM

## 2020-05-18 RX ORDER — SODIUM CHLORIDE 0.9 % (FLUSH) 0.9 %
10 SYRINGE (ML) INJECTION EVERY 12 HOURS SCHEDULED
Status: DISCONTINUED | OUTPATIENT
Start: 2020-05-18 | End: 2020-05-18 | Stop reason: SDUPTHER

## 2020-05-18 RX ORDER — FAMOTIDINE 20 MG/1
20 TABLET, FILM COATED ORAL 2 TIMES DAILY
Qty: 60 TABLET | Refills: 0 | Status: SHIPPED
Start: 2020-05-18 | End: 2020-05-20 | Stop reason: HOSPADM

## 2020-05-18 RX ORDER — SODIUM CHLORIDE 9 MG/ML
INJECTION, SOLUTION INTRAVENOUS CONTINUOUS
Status: DISCONTINUED | OUTPATIENT
Start: 2020-05-18 | End: 2020-05-20 | Stop reason: HOSPADM

## 2020-05-18 RX ORDER — LORAZEPAM 2 MG/ML
2 INJECTION INTRAMUSCULAR
Status: DISCONTINUED | OUTPATIENT
Start: 2020-05-18 | End: 2020-05-20 | Stop reason: HOSPADM

## 2020-05-18 RX ORDER — LORAZEPAM 2 MG/1
4 TABLET ORAL
Status: DISCONTINUED | OUTPATIENT
Start: 2020-05-18 | End: 2020-05-20 | Stop reason: HOSPADM

## 2020-05-18 RX ORDER — LORAZEPAM 2 MG/ML
1 INJECTION INTRAMUSCULAR
Status: DISCONTINUED | OUTPATIENT
Start: 2020-05-18 | End: 2020-05-20 | Stop reason: HOSPADM

## 2020-05-18 RX ORDER — FOLIC ACID 1 MG/1
1 TABLET ORAL DAILY
Status: DISCONTINUED | OUTPATIENT
Start: 2020-05-18 | End: 2020-05-20 | Stop reason: HOSPADM

## 2020-05-18 RX ADMIN — SODIUM CHLORIDE: 9 INJECTION, SOLUTION INTRAVENOUS at 05:57

## 2020-05-18 RX ADMIN — Medication 325 MG: at 09:32

## 2020-05-18 RX ADMIN — THERA TABS 1 TABLET: TAB at 17:41

## 2020-05-18 RX ADMIN — LORAZEPAM 1 MG: 2 INJECTION INTRAMUSCULAR at 12:57

## 2020-05-18 RX ADMIN — Medication 100 MG: at 17:41

## 2020-05-18 RX ADMIN — FOLIC ACID 1 MG: 1 TABLET ORAL at 17:41

## 2020-05-18 RX ADMIN — ACETAMINOPHEN 650 MG: 325 TABLET ORAL at 20:25

## 2020-05-18 RX ADMIN — LORAZEPAM 1 MG: 2 INJECTION, SOLUTION INTRAMUSCULAR; INTRAVENOUS at 12:57

## 2020-05-18 RX ADMIN — PANTOPRAZOLE SODIUM 40 MG: 40 TABLET, DELAYED RELEASE ORAL at 05:57

## 2020-05-18 RX ADMIN — SODIUM CHLORIDE: 9 INJECTION, SOLUTION INTRAVENOUS at 17:46

## 2020-05-18 NOTE — PROGRESS NOTES
Objective:   /88   Pulse 68   Temp 98.7 °F (37.1 °C) (Oral)   Resp 18   Ht 6' 4\" (1.93 m)   Wt 170 lb 10.2 oz (77.4 kg)   SpO2 97%   BMI 20.77 kg/m²        TELEMETRY: SR    Physical Exam:  General Appearance: alert and oriented to person, place and time, in no acute distress  Cardiovascular: normal rate, regular rhythm, normal S1 and S2, no murmurs, rubs, clicks, or gallops, distal pulses intact, Pulmonary/Chest: clear to auscultation bilaterally- no wheezes, rales or rhonchi, normal air movement, no respiratory distress  Abdomen: soft, non-tender, non-distended, normal bowel sounds, no masses Extremities: no cyanosis, clubbing or edema, pulses present    Skin: warm and dry, no rash or erythema  Head: normocephalic and atraumatic   Musculoskeletal: normal range of motion, no joint swelling, deformity or tenderness  Neurological: alert, oriented, normal speech, no focal findings or movement disorder noted    Medications:    aspirin  325 mg Oral Daily    sodium chloride flush  10 mL Intravenous 2 times per day    pantoprazole  40 mg Oral QAM AC      sodium chloride 75 mL/hr at 05/18/20 0557     sodium chloride flush, 10 mL, PRN  acetaminophen, 650 mg, Q4H PRN  magnesium hydroxide, 30 mL, Daily PRN        Diagnostics:  EKG:  SR      Echo: pending      Left Heart Cath:   FINDINGS:  HEMODYNAMICS:  LVEDP 5 mmHg. LEFT VENTRICULOGRAPHY:  Ejection fraction 55%-60%.     CORONARY ANGIOGRAM:  1. Right coronary artery. Right coronary artery is patent without  significant stenosis. 2.  Left main coronary artery patent without significant stenotic  lesions, trifurcates into LAD, LCX and ramus intermedius. 3.  Ramus intermedius patent. 4.  Left circumflex artery patent without significant stenosis. 5.  Left anterior descending artery patent. No significant stenotic  lesions. CONCLUSION:  1. Normal coronary angiogram.  2.  Not a STEMI.   3.  Differential diagnoses includes pericarditis versus GI

## 2020-05-18 NOTE — CARE COORDINATION
5/18/20, 1:22 PM EDT  DISCHARGE PLANNING EVALUATION:    Joey Graham       Admitted from: ED from urgent care. 5/17/2020/ [facility] day: 1   Location: [facility]21/021 Reason for admit: Chest pain [R07.9] Status:   Admit order signed?:   PMH:  has no past medical history on file. Procedure: 5/17/20 heart cath  Pertinent abnormal Imaging:  Medications:  Scheduled Meds:   aspirin  325 mg Oral Daily    sodium chloride flush  10 mL Intravenous 2 times per day    pantoprazole  40 mg Oral QAM AC     Continuous Infusions:   sodium chloride 75 mL/hr at 05/18/20 0557      Pertinent Info/Orders/Treatment Plan: Code called - had seizure. Sitter now at bedside. Telemetry monitoring. IV fluids. Pain management. Neuro checks. Monitor labs. Seizure precautions. Diet: DIET GENERAL;   Smoking status:  reports that he has been smoking cigarettes. He has been smoking about 0.50 packs per day. He has never used smokeless tobacco.   PCP: No primary care provider on file. Readmission 30 days or less: no  Readmission Risk Score: 7%    Discharge Planning Evaluation  Current Residence:  Private Residence  Living Arrangements:  Family Members   Support Systems:  Family Members  Current Services PTA:     Potential Assistance Needed:  N/A  Potential Assistance Purchasing Medications:  No  Does patient want to participate in local refill/ meds to beds program?  No  Type of Home Care Services:  None  Patient expects to be discharged to:  Home  Expected Discharge date:  05/18/20  Follow Up Appointment: Best Day/ Time: Monday AM    Patient Goals/Plan/Treatment Preferences: Josselyn Trotter just had a seizure. It is noted that he is from home with family. Needs meet and greet. No PCP listed. Per nursing note, \"Patient has no PCP, asked if he would like assistance finding one or if he was going to do so. Stated he was not going to schedule an appointment and does not need assistance. \" CM to followup when alert.    Transportation/Food

## 2020-05-18 NOTE — PROGRESS NOTES
2mL of air released from pressure dressing to right wrist. No bleeding distal pulses present. Denies pain, numbness and tingling to digits and hand.

## 2020-05-18 NOTE — CONSULTS
agrees to proceed. Patient denies dyspnea on exertion, orthopnea, paroxysmal nocturnal dyspnea, palpitations, dizziness, syncope, recent weight gain or leg swelling. \" Admitted under cardiology service.      Pt had cardiac cath on 5/17, normal coronary angiogram, normal EF, no PCI done/indicated. Medical therapy recommended. Started on protonix.      5/18: Was supposed to be discharged however around 1230 suddenly fell while RN was in the room. RN broke his fall, witnessed pt having a seizure with convulsions that lasted less than a minute. Rapid response called. Pt was diaphoretic. Initially was awake but not responsive to questions. About 5 minutes later was able to respond, was AAOx3 per documentation. Cardiology was contacted, pt seen by [facility]. Plan to transfer pt to . Neurology consulted. Subjective: Patient seen and examined while he is lying in bed. He states he does not fully remember what happened, only remembers that he was being given discharge papers and that he felt like he was going to pass out. He states his right arm and right leg feel heavy and he feels like he is having a hard time moving them. He reports that he smokes half pack per day and has been doing so since age of 24. He reports occasional marijuana use. He reports drinking 2-3 beers every day and that his last drink was 4 days ago. He denies any audio or visual hallucinations but states that he has been feeling weak and like he is having tremors. He otherwise denies headaches, blurry vision, cough, shortness of breath, chest pain, abdominal pain, nausea, vomiting. Past Medical History:    History reviewed. No pertinent past medical history. Past Surgical History:    History reviewed. No pertinent surgical history. Medications Prior to Admission:    Prior to Admission medications    Medication Sig Start Date End Date Taking?  Authorizing Provider   famotidine (PEPCID) 20 MG tablet Take 1 tablet by mouth 2 times daily 5/18/20  Yes Los Hall MD       Allergies:  Patient has no known allergies. Social History:      The patient currently lives at home    TOBACCO:   reports that he has been smoking cigarettes. He has been smoking about 0.50 packs per day. He has never used smokeless tobacco.  ETOH:   reports current alcohol use. Family History:           Problem Relation Age of Onset    Heart Attack Brother        Diet:  DIET GENERAL;    REVIEW OF SYSTEMS:   Pertinent positives as noted in the HPI. All other systems reviewed and negative. PHYSICAL EXAM:  BP (!) 135/97   Pulse 99   Temp 98.7 °F (37.1 °C) (Oral)   Resp 18   Ht 6' 4\" (1.93 m)   Wt 170 lb 10.2 oz (77.4 kg)   SpO2 97%   BMI 20.77 kg/m²     General appearance: No apparent distress, appears stated age and cooperative. HEENT: Pupils equal, round, and reactive to light. Conjunctivae/corneas clear. Neck: Supple, with full range of motion. No jugular venous distention. Trachea midline. Respiratory:  Normal respiratory effort. Clear to auscultation, bilaterally without Rales/Wheezes/Rhonchi. Cardiovascular: Regular rhythm with normal S1/S2 without murmurs, rubs or gallops. Positive for tachycardia. Abdomen: Soft, non-tender, non-distended with normal bowel sounds. Musculoskeletal: passive and active ROM x 4 extremities. Skin: Skin color, texture, turgor normal.  No rashes or lesions. Neurologic:  Neurovascularly intact without any focal sensory/motor deficits. Strength intact and symmetrical bilaterally, motor 5/5 bL.  Cranial nerves: II-XII intact, grossly non-focal.  Psychiatric: Alert and oriented, thought content appropriate, normal insight  Capillary Refill: Brisk,< 3 seconds   Peripheral Pulses: +2 palpable, equal bilaterally     Labs:     Recent Labs     05/17/20 1427 05/18/20  0615   WBC 5.8 4.1*   HGB 13.1* 12.0*   HCT 39.7* 36.2*   * 101*     Recent Labs     05/17/20 1427 05/18/20  0615   * 132*   K 4.0 3.6   CL 92* 94*

## 2020-05-18 NOTE — PROGRESS NOTES
Attempted to evaluate patient twice this afternoon, he is off the floor for testing.  Will re attempt in am.   Brittni Murray MD

## 2020-05-18 NOTE — PROGRESS NOTES
Released 2mL of air from pressure dressing to right wrist. No bleeding. Distal pulses palpable. Denies pain.

## 2020-05-18 NOTE — CODE DOCUMENTATION
Pupils equal, round and reactive to light. Patient awake but not responding to orientation questions. EKG in process.

## 2020-05-18 NOTE — PROCEDURES
EKG was handed to SUSANNE Carbajal.
Differential diagnoses includes pericarditis versus GI etiology. 4.  Tobacco abuse. 5.  Family history of premature CAD. RECOMMENDATIONS:  1. We will admit the patient to the hospital for observation. 2.  Monitor on telemetry. 3.  Medical therapy. 4.  Aspirin 81 mg p.o. daily. 5   Check lipid panel. 6.  Echocardiogram in a.m.  7.  Start Protonix 40 mg p.o. daily. Findings and plan of care discussed with the patient.         Khai Portillo MD    D: 05/17/2020 15:42:40       T: 05/17/2020 15:45:28     [email or web address]  Job#: [id number]     Doc#: [id number]    CC:

## 2020-05-18 NOTE — PLAN OF CARE
Problem: Falls - Risk of:  Goal: Will remain free from falls  Description: Will remain free from falls  Outcome: Ongoing  Note: Patient fell this shift with seizure, ambulates stand by assist. Call light within reach. Side rails up x2. Bed alarm on. Non skid slippers available. Problem: Safety:  Goal: Free from accidental physical injury  Description: Free from accidental physical injury  Outcome: Ongoing  Note: Patient had seizure this day. CT head negative, seizure pads in place with no further seizure activity. Will continue to monitor. Problem: Pain:  Goal: Patient's pain/discomfort is manageable  Description: Patient's pain/discomfort is manageable  Outcome: Ongoing  Note: Patient free from pain this shift. Pain rated on 0-10 pain rating scale. Will continue to reassess. Problem: Skin Integrity:  Goal: Skin integrity will stabilize  Description: Skin integrity will stabilize  Outcome: Ongoing  Note: No new skin issues, will continue to monitor. Problem: Discharge Planning:  Goal: Patients continuum of care needs are met  Description: Patients continuum of care needs are met  Outcome: Ongoing  Note: Patient plans to be discharged home alone when medically stable. Care plan reviewed with patient. Patient verbalize understanding of the plan of care and contribute to goal setting.

## 2020-05-18 NOTE — PROGRESS NOTES
Patient has no PCP, asked if he would like assistance finding one or if he was going to do so. Stated he was not going to schedule an appointment and does not need assistance.

## 2020-05-18 NOTE — PLAN OF CARE
Problem: Falls - Risk of:  Goal: Will remain free from falls  Description: Will remain free from falls  Outcome: Ongoing  Note: Falling star placed outside of patient's room. 2/4 bed rails up. Non-skid socks provided. Call light and personal items with in reach. Bed alarm on. Problem: Skin Integrity:  Goal: Will show no infection signs and symptoms  Description: Will show no infection signs and symptoms  Outcome: Ongoing  Note: No new skin breakdown noted at this time. Will continue to reassess. Patient turns and repositions self in bed frequent        Problem: Discharge Planning:  Goal: Discharged to appropriate level of care  Description: Discharged to appropriate level of care  Outcome: Ongoing  Note: Plans to discharge home with family. Problem: Pain:  Goal: Pain level will decrease  Description: Pain level will decrease  Outcome: Ongoing  Note: Patient rates pain on 0-10 pain scale. States pain is a 5 on 0-10 scale. States goal is 0. PRN Tylenol given as needed. Repositioned for comfort. Will continue to reassess. Care plan reviewed with patient. No concerns voiced.

## 2020-05-19 ENCOUNTER — APPOINTMENT (OUTPATIENT)
Dept: MRI IMAGING | Age: 30
DRG: 392 | End: 2020-05-19

## 2020-05-19 LAB
ALBUMIN SERPL-MCNC: 3.8 G/DL (ref 3.5–5.1)
ALP BLD-CCNC: 66 U/L (ref 38–126)
ALT SERPL-CCNC: 130 U/L (ref 11–66)
ANION GAP SERPL CALCULATED.3IONS-SCNC: 11 MEQ/L (ref 8–16)
AST SERPL-CCNC: 114 U/L (ref 5–40)
BASOPHILS # BLD: 0.5 %
BASOPHILS ABSOLUTE: 0 THOU/MM3 (ref 0–0.1)
BILIRUB SERPL-MCNC: 0.4 MG/DL (ref 0.3–1.2)
BUN BLDV-MCNC: 4 MG/DL (ref 7–22)
CALCIUM SERPL-MCNC: 8.7 MG/DL (ref 8.5–10.5)
CHLORIDE BLD-SCNC: 102 MEQ/L (ref 98–111)
CO2: 22 MEQ/L (ref 23–33)
CREAT SERPL-MCNC: 0.7 MG/DL (ref 0.4–1.2)
EOSINOPHIL # BLD: 0.5 %
EOSINOPHILS ABSOLUTE: 0 THOU/MM3 (ref 0–0.4)
ERYTHROCYTE [DISTWIDTH] IN BLOOD BY AUTOMATED COUNT: 12.7 % (ref 11.5–14.5)
ERYTHROCYTE [DISTWIDTH] IN BLOOD BY AUTOMATED COUNT: 42.3 FL (ref 35–45)
GFR SERPL CREATININE-BSD FRML MDRD: > 90 ML/MIN/1.73M2
GLUCOSE BLD-MCNC: 108 MG/DL (ref 70–108)
HCT VFR BLD CALC: 35.6 % (ref 42–52)
HEMOGLOBIN: 11.8 GM/DL (ref 14–18)
IMMATURE GRANS (ABS): 0.03 THOU/MM3 (ref 0–0.07)
IMMATURE GRANULOCYTES: 0.5 %
LYMPHOCYTES # BLD: 15.5 %
LYMPHOCYTES ABSOLUTE: 1 THOU/MM3 (ref 1–4.8)
MCH RBC QN AUTO: 29.9 PG (ref 26–33)
MCHC RBC AUTO-ENTMCNC: 33.1 GM/DL (ref 32.2–35.5)
MCV RBC AUTO: 90.4 FL (ref 80–94)
MONOCYTES # BLD: 13.7 %
MONOCYTES ABSOLUTE: 0.9 THOU/MM3 (ref 0.4–1.3)
NUCLEATED RED BLOOD CELLS: 0 /100 WBC
PLATELET # BLD: 107 THOU/MM3 (ref 130–400)
PMV BLD AUTO: 10.9 FL (ref 9.4–12.4)
POTASSIUM REFLEX MAGNESIUM: 3.7 MEQ/L (ref 3.5–5.2)
RBC # BLD: 3.94 MILL/MM3 (ref 4.7–6.1)
SEG NEUTROPHILS: 69.3 %
SEGMENTED NEUTROPHILS ABSOLUTE COUNT: 4.4 THOU/MM3 (ref 1.8–7.7)
SODIUM BLD-SCNC: 135 MEQ/L (ref 135–145)
TOTAL PROTEIN: 6.7 G/DL (ref 6.1–8)
WBC # BLD: 6.4 THOU/MM3 (ref 4.8–10.8)

## 2020-05-19 PROCEDURE — 70551 MRI BRAIN STEM W/O DYE: CPT

## 2020-05-19 PROCEDURE — 2580000003 HC RX 258: Performed by: STUDENT IN AN ORGANIZED HEALTH CARE EDUCATION/TRAINING PROGRAM

## 2020-05-19 PROCEDURE — 95819 EEG AWAKE AND ASLEEP: CPT

## 2020-05-19 PROCEDURE — 85025 COMPLETE CBC W/AUTO DIFF WBC: CPT

## 2020-05-19 PROCEDURE — 6370000000 HC RX 637 (ALT 250 FOR IP): Performed by: INTERNAL MEDICINE

## 2020-05-19 PROCEDURE — 94760 N-INVAS EAR/PLS OXIMETRY 1: CPT

## 2020-05-19 PROCEDURE — 6370000000 HC RX 637 (ALT 250 FOR IP): Performed by: STUDENT IN AN ORGANIZED HEALTH CARE EDUCATION/TRAINING PROGRAM

## 2020-05-19 PROCEDURE — 80053 COMPREHEN METABOLIC PANEL: CPT

## 2020-05-19 PROCEDURE — 36415 COLL VENOUS BLD VENIPUNCTURE: CPT

## 2020-05-19 PROCEDURE — 95816 EEG AWAKE AND DROWSY: CPT | Performed by: PSYCHIATRY & NEUROLOGY

## 2020-05-19 PROCEDURE — 1200000003 HC TELEMETRY R&B

## 2020-05-19 PROCEDURE — 99223 1ST HOSP IP/OBS HIGH 75: CPT | Performed by: PSYCHIATRY & NEUROLOGY

## 2020-05-19 PROCEDURE — 99232 SBSQ HOSP IP/OBS MODERATE 35: CPT | Performed by: INTERNAL MEDICINE

## 2020-05-19 RX ADMIN — SODIUM CHLORIDE: 9 INJECTION, SOLUTION INTRAVENOUS at 15:26

## 2020-05-19 RX ADMIN — FOLIC ACID 1 MG: 1 TABLET ORAL at 09:45

## 2020-05-19 RX ADMIN — ACETAMINOPHEN 650 MG: 325 TABLET ORAL at 04:03

## 2020-05-19 RX ADMIN — THERA TABS 1 TABLET: TAB at 09:45

## 2020-05-19 RX ADMIN — Medication 5 ML: at 15:26

## 2020-05-19 RX ADMIN — Medication 325 MG: at 09:45

## 2020-05-19 RX ADMIN — ACETAMINOPHEN 650 MG: 325 TABLET ORAL at 15:28

## 2020-05-19 RX ADMIN — Medication 5 ML: at 19:48

## 2020-05-19 RX ADMIN — SODIUM CHLORIDE: 9 INJECTION, SOLUTION INTRAVENOUS at 23:25

## 2020-05-19 RX ADMIN — PANTOPRAZOLE SODIUM 40 MG: 40 TABLET, DELAYED RELEASE ORAL at 04:05

## 2020-05-19 RX ADMIN — Medication 100 MG: at 09:45

## 2020-05-19 ASSESSMENT — PAIN SCALES - GENERAL
PAINLEVEL_OUTOF10: 0
PAINLEVEL_OUTOF10: 3
PAINLEVEL_OUTOF10: 2
PAINLEVEL_OUTOF10: 0
PAINLEVEL_OUTOF10: 0
PAINLEVEL_OUTOF10: 6

## 2020-05-19 ASSESSMENT — PAIN DESCRIPTION - FREQUENCY: FREQUENCY: INTERMITTENT

## 2020-05-19 ASSESSMENT — PAIN DESCRIPTION - LOCATION: LOCATION: MOUTH

## 2020-05-19 ASSESSMENT — PAIN DESCRIPTION - ORIENTATION: ORIENTATION: LEFT;INNER

## 2020-05-19 ASSESSMENT — PAIN DESCRIPTION - DESCRIPTORS: DESCRIPTORS: SORE

## 2020-05-19 ASSESSMENT — PAIN DESCRIPTION - PAIN TYPE: TYPE: ACUTE PAIN

## 2020-05-19 NOTE — PLAN OF CARE
Problem: Falls - Risk of:  Goal: Will remain free from falls  Description: Will remain free from falls  Outcome: Ongoing  Note: Call light within reach. Side rails up x2. Bed alarm on. Non skid slippers available. Problem: Safety:  Goal: Ability to remain free from injury will improve  Description: Ability to remain free from injury will improve  Outcome: Ongoing  Note: Patient has had no seizures this shift. Will continue to monitor. Problem: Pain:  Goal: Patient's pain/discomfort is manageable  Description: Patient's pain/discomfort is manageable  Outcome: Ongoing  Note: Patient free from pain this shift. Pain rated on 0-10 pain rating scale. Will continue to reassess. Problem: Discharge Planning:  Goal: Patients continuum of care needs are met  Description: Patients continuum of care needs are met  Outcome: Ongoing  Note: Patient plans to be discharged to home with family when medically stable. Problem: Physical Regulation:  Goal: Ability to maintain a stable neurologic state will improve  Description: Ability to maintain a stable neurologic state will improve  Outcome: Ongoing  Note: Patient neuro assessment negative. Care plan reviewed with patient. Patient verbalize understanding of the plan of care and contribute to goal setting.

## 2020-05-19 NOTE — PROGRESS NOTES
notified yes    Pharmacy notified no Time Notified: 12:43    House Supervisor/Clinical Manager Notified:   Narda Duran (House Supervisor) and Cecile (8AB Manager)  Date:   5/18/2020 Time:   12:43  Family Member Notified:    Mother   Date:   5/18/2020 Time:   14:00

## 2020-05-19 NOTE — CONSULTS
reviewed. No pertinent past medical history. History reviewed. No pertinent surgical history. Allergies:    No Known Allergies     Current Medications:   aspirin EC tablet 325 mg, Daily  nicotine (NICODERM CQ) 7 MG/24HR 1 patch, Daily  0.9 % sodium chloride infusion, Continuous  folic acid (FOLVITE) tablet 1 mg, Daily  vitamin B-1 (THIAMINE) tablet 100 mg, Daily  multivitamin 1 tablet, Daily  LORazepam (ATIVAN) tablet 1 mg, Q1H PRN    Or  LORazepam (ATIVAN) injection 1 mg, Q1H PRN    Or  LORazepam (ATIVAN) tablet 2 mg, Q1H PRN    Or  LORazepam (ATIVAN) injection 2 mg, Q1H PRN    Or  LORazepam (ATIVAN) tablet 3 mg, Q1H PRN    Or  LORazepam (ATIVAN) injection 3 mg, Q1H PRN    Or  LORazepam (ATIVAN) tablet 4 mg, Q1H PRN    Or  LORazepam (ATIVAN) injection 4 mg, Q1H PRN  sodium chloride flush 0.9 % injection 10 mL, 2 times per day  sodium chloride flush 0.9 % injection 10 mL, PRN  acetaminophen (TYLENOL) tablet 650 mg, Q4H PRN  magnesium hydroxide (MILK OF MAGNESIA) 400 MG/5ML suspension 30 mL, Daily PRN  pantoprazole (PROTONIX) tablet 40 mg, QAM AC         Social History:  Social History     Tobacco Use   Smoking Status Current Every Day Smoker    Packs/day: 0.50    Types: Cigarettes   Smokeless Tobacco Never Used     Social History     Substance and Sexual Activity   Alcohol Use Yes    Comment: socially     Social History     Substance and Sexual Activity   Drug Use Never     Single    Family History:       Problem Relation Age of Onset    Heart Attack Brother        Review of Systems:  All systems reviewed and are all negative except what is mentioned in history of present illness. Physical Exam:  BP (!) 137/92   Pulse 78   Temp 98.1 °F (36.7 °C) (Oral)   Resp 18   Ht 6' 4\" (1.93 m)   Wt 163 lb (73.9 kg)   SpO2 98%   BMI 19.84 kg/m²  I Body mass index is 19.84 kg/m².  I   Wt Readings from Last 1 Encounters:   05/19/20 163 lb (73.9 kg)         General appearance - alert, well appearing, and in no 107*   MCV 88.6 89.6 90.4   MCH 29.2 29.7 29.9   MCHC 33.0 33.1 33.1     CMP:  Recent Labs     05/18/20  0615 05/18/20  1544 05/19/20  0604   * 137 135   K 3.6 4.0 3.7   CL 94* 100 102   CO2 26 26 22*   BUN 8 6* 4*   CREATININE 0.9 0.9 0.7   LABGLOM >90 >90 >90   GLUCOSE 111* 101 108   CALCIUM 9.0 9.1 8.7     Liver:   Recent Labs     05/19/20  0604   *   *   ALKPHOS 66   PROT 6.7   LABALBU 3.8   BILITOT 0.4      I reviewed the CT brain and agree with interpretation. Results for orders placed during the hospital encounter of 05/17/20   CT head WO contrast    Narrative PROCEDURE: CT HEAD WO CONTRAST    CLINICAL INFORMATION: Seizure . COMPARISON: No prior study. TECHNIQUE: 2-D multiplanar noncontrast images of the brain  All CT scans at this facility use dose modulation, iterative reconstruction, and/or weight-based dosing when appropriate to reduce radiation dose to as low as reasonably achievable. FINDINGS: There is no infarct or hemorrhage. There is no extra-axial collection. Gray-white differentiation is normal. Ventricles are normal.  Calvarium is intact. Visualized paranasal sinuses and mastoid air cells are clear. Impression Unremarkable CT brain        **This report has been created using voice recognition software. It may contain minor errors which are inherent in voice recognition technology. **    Final report electronically signed by Dr. Jessie Perez on 5/18/2020 5:00 PM         We reviewed the patient records and available information in the EHR       Impression:    Active Problems:    Chest pain    Seizure-like activity ([facility] [address]  Resolved Problems:    * No resolved hospital problems. *  This is a 26year old male who initially presented with chest pain with abnormal EKG showing ST elevation. He had a cardiac cath as well as cardiac echo on 5/17/20 that showed no concerning findings.  He was to be discharged home on 5/18/20 and as he was about to sign discharge papers when

## 2020-05-19 NOTE — PROGRESS NOTES
admission. ROS (12 point review of systems completed. Pertinent positives noted. Otherwise ROS is negative)      Scheduled Meds:   aspirin  325 mg Oral Daily    nicotine  1 patch Transdermal Daily    folic acid  1 mg Oral Daily    thiamine  100 mg Oral Daily    multivitamin  1 tablet Oral Daily    sodium chloride flush  10 mL Intravenous 2 times per day    pantoprazole  40 mg Oral QAM AC     Continuous Infusions:   sodium chloride 125 mL/hr at 05/18/20 1746     PRN Meds:. LORazepam **OR** LORazepam **OR** LORazepam **OR** LORazepam **OR** LORazepam **OR** LORazepam **OR** LORazepam **OR** LORazepam, sodium chloride flush, acetaminophen, magnesium hydroxide \  Diet:  DIET GENERAL;    REVIEW OF SYSTEMS:   Pertinent positives as noted in the HPI. All other systems reviewed and negative. PHYSICAL EXAM:  BP (!) 131/91   Pulse 76   Temp 97.8 °F (36.6 °C) (Oral)   Resp 16   Ht 6' 4\" (1.93 m)   Wt 163 lb (73.9 kg)   SpO2 93%   BMI 19.84 kg/m²     General appearance: No apparent distress, appears stated age and cooperative. HEENT: Pupils equal, round, and reactive to light. Conjunctivae/corneas clear. Neck: Supple, with full range of motion. No jugular venous distention. Trachea midline. Respiratory:  Normal respiratory effort. Clear to auscultation, bilaterally without Rales/Wheezes/Rhonchi. Cardiovascular: Regular rhythm with normal S1/S2 without murmurs, rubs or gallops. Positive for tachycardia. Abdomen: Soft, non-tender, non-distended with normal bowel sounds. Musculoskeletal: passive and active ROM x 4 extremities. Skin: Skin color, texture, turgor normal.  No rashes or lesions. Neurologic:  Neurovascularly intact without any focal sensory/motor deficits. Strength intact and symmetrical bilaterally, motor 5/5 bL.  Cranial nerves: II-XII intact, grossly non-focal.  Psychiatric: Alert and oriented, thought content appropriate, normal insight  Capillary Refill: Brisk,< 3 seconds   Peripheral Pulses: +2 palpable, equal bilaterally     Labs:     Recent Labs     05/17/20  1427 05/18/20  0615 05/19/20  0604   WBC 5.8 4.1* 6.4   HGB 13.1* 12.0* 11.8*   HCT 39.7* 36.2* 35.6*   * 101* 107*     Recent Labs     05/18/20  0615 05/18/20  1544 05/19/20  0604   * 137 135   K 3.6 4.0 3.7   CL 94* 100 102   CO2 26 26 22*   BUN 8 6* 4*   CREATININE 0.9 0.9 0.7   CALCIUM 9.0 9.1 8.7   PHOS  --  1.9*  --      Recent Labs     05/17/20  1427 05/19/20  0604   * 114*   * 130*   BILIDIR <0.2  --    BILITOT 0.6 0.4   ALKPHOS 84 66     No results for input(s): INR in the last 72 hours. Recent Labs     05/18/20  1544   CKTOTAL 247*       Urinalysis:    Lab Results   Component Value Date    NITRU NEGATIVE 05/18/2020    WBCUA 0-2 05/17/2020    BACTERIA NONE SEEN 05/17/2020    RBCUA 3-5 05/17/2020    BLOODU NEGATIVE 05/18/2020    SPECGRAV 1.007 05/18/2020    GLUCOSEU see below 05/17/2020       Radiology: I have reviewed the radiology reports with the following interpretation:     MRI Brain WO Contrast   Final Result    Normal MRI of the brain. **This report has been created using voice recognition software. It may contain minor errors which are inherent in voice recognition technology. **      Final report electronically signed by Dr. Josefina Hunter on 5/19/2020 1:00 PM      US LIVER   Final Result      Mildly echogenic liver most consistent with fatty infiltration. **This report has been created using voice recognition software. It may contain minor errors which are inherent in voice recognition technology. **      Final report electronically signed by Dr. Sharonda Hackett on 5/18/2020 5:06 PM      CT head WO contrast   Final Result   Unremarkable CT brain            **This report has been created using voice recognition software. It may contain minor errors which are inherent in voice recognition technology. **      Final report electronically signed by Dr. Ne Sims on

## 2020-05-19 NOTE — PROGRESS NOTES
[facility] Laboratory Technician Worksheet      EEG Date: 2020    Name: Yayo Montiel   : 1990   Age: 26 y.o. SEX: male    ROOM: [facility] MRN: [id number]           CSN: [id number]      Ordering Provider: Venkata Washington  EEG Number: [id number] Time of Test:  4356    Hand: Right   Sedation: no    H.V. Done: No NOT DONE Photic: Yes    Sleep: Yes  Drowsy: Yes   Sleep Deprived: No    Seizures observed: no    Mentality: alert      Clinical History:ADMITTED FOR HEART BURN CHEST PAIN ON  PATIENT HAD A WITNESSED SEIZURE LASTING 1 MINUTE    Past Medical History: History reviewed. No pertinent past medical history. Scheduled Meds:   aspirin  325 mg Oral Daily    nicotine  1 patch Transdermal Daily    folic acid  1 mg Oral Daily    thiamine  100 mg Oral Daily    multivitamin  1 tablet Oral Daily    sodium chloride flush  10 mL Intravenous 2 times per day    pantoprazole  40 mg Oral QAM AC     Continuous Infusions:   sodium chloride 125 mL/hr at 20 1746     PRN Meds:. LORazepam **OR** LORazepam **OR** LORazepam **OR** LORazepam **OR** LORazepam **OR** LORazepam **OR** LORazepam **OR** LORazepam, sodium chloride flush, acetaminophen, magnesium hydroxide    Technician: Mookie Damian 2020

## 2020-05-20 VITALS
RESPIRATION RATE: 18 BRPM | WEIGHT: 164.1 LBS | SYSTOLIC BLOOD PRESSURE: 137 MMHG | HEART RATE: 80 BPM | HEIGHT: 76 IN | TEMPERATURE: 98 F | BODY MASS INDEX: 19.98 KG/M2 | DIASTOLIC BLOOD PRESSURE: 100 MMHG | OXYGEN SATURATION: 98 %

## 2020-05-20 PROCEDURE — 6370000000 HC RX 637 (ALT 250 FOR IP): Performed by: STUDENT IN AN ORGANIZED HEALTH CARE EDUCATION/TRAINING PROGRAM

## 2020-05-20 PROCEDURE — 94761 N-INVAS EAR/PLS OXIMETRY MLT: CPT

## 2020-05-20 PROCEDURE — 6370000000 HC RX 637 (ALT 250 FOR IP): Performed by: INTERNAL MEDICINE

## 2020-05-20 PROCEDURE — 2580000003 HC RX 258: Performed by: STUDENT IN AN ORGANIZED HEALTH CARE EDUCATION/TRAINING PROGRAM

## 2020-05-20 PROCEDURE — 99239 HOSP IP/OBS DSCHRG MGMT >30: CPT | Performed by: INTERNAL MEDICINE

## 2020-05-20 RX ORDER — FOLIC ACID 1 MG/1
1 TABLET ORAL DAILY
Qty: 30 TABLET | Refills: 0 | Status: SHIPPED | OUTPATIENT
Start: 2020-05-21

## 2020-05-20 RX ORDER — LANOLIN ALCOHOL/MO/W.PET/CERES
100 CREAM (GRAM) TOPICAL DAILY
Qty: 30 TABLET | Refills: 0 | Status: SHIPPED | OUTPATIENT
Start: 2020-05-21

## 2020-05-20 RX ORDER — MULTIVITAMIN WITH IRON
1 TABLET ORAL DAILY
Qty: 30 TABLET | Refills: 0 | Status: SHIPPED | OUTPATIENT
Start: 2020-05-21

## 2020-05-20 RX ORDER — PANTOPRAZOLE SODIUM 40 MG/1
40 TABLET, DELAYED RELEASE ORAL
Qty: 30 TABLET | Refills: 0 | Status: SHIPPED | OUTPATIENT
Start: 2020-05-21

## 2020-05-20 RX ADMIN — PANTOPRAZOLE SODIUM 40 MG: 40 TABLET, DELAYED RELEASE ORAL at 05:29

## 2020-05-20 RX ADMIN — Medication 100 MG: at 08:06

## 2020-05-20 RX ADMIN — SODIUM CHLORIDE: 9 INJECTION, SOLUTION INTRAVENOUS at 08:06

## 2020-05-20 RX ADMIN — Medication 325 MG: at 08:06

## 2020-05-20 RX ADMIN — THERA TABS 1 TABLET: TAB at 08:06

## 2020-05-20 RX ADMIN — FOLIC ACID 1 MG: 1 TABLET ORAL at 08:06

## 2020-05-20 RX ADMIN — Medication 5 ML: at 08:06

## 2020-05-20 ASSESSMENT — PAIN SCALES - GENERAL
PAINLEVEL_OUTOF10: 0
PAINLEVEL_OUTOF10: 0

## 2020-05-20 NOTE — DISCHARGE SUMMARY
created using voice recognition software. It may contain minor errors which are inherent in voice recognition technology. **      Final report electronically signed by Dr. Jonathan Hudson on 5/18/2020 5:06 PM      CT head WO contrast   Final Result   Unremarkable CT brain            **This report has been created using voice recognition software. It may contain minor errors which are inherent in voice recognition technology. **      Final report electronically signed by Dr. Carolina Fajardo on 5/18/2020 5:00 PM      XR CHEST PORTABLE   Final Result   1. Unremarkable AP portable chest radiograph. **This report has been created using voice recognition software. It may contain minor errors which are inherent in voice recognition technology. **      Final report electronically signed by Dr. Wynelle Mortimer on 5/17/2020 2:46 PM             Consults:     IP CONSULT TO HOSPITALIST  IP CONSULT TO NEUROLOGY    Disposition:    [x] Home       [] TCU       [] Rehab       [] Psych       [] SNF       [] [place]       [] Other-    Condition at Discharge: Stable    Code Status:  Full Code     Patient Instructions: Activity: activity as tolerated  Diet: DIET GENERAL;      Follow-up visits:   No follow-up provider specified.        Discharge Medications:      Tiffany Gonzalez   Home Medication Instructions VTR:[id number]    Printed on:05/20/20 1901   Medication Information                      folic acid (FOLVITE) 1 MG tablet  Take 1 tablet by mouth daily             Magic Mouthwash (MIRACLE MOUTHWASH)  Swish and spit 5 mLs 4 times daily as needed for Irritation             Multiple Vitamin (MULTIVITAMIN) TABS tablet  Take 1 tablet by mouth daily             pantoprazole (PROTONIX) 40 MG tablet  Take 1 tablet by mouth every morning (before breakfast)             vitamin B-1 100 MG tablet  Take 1 tablet by mouth daily                 Time Spent on discharge is more than 30 minutes in the examination, evaluation, counseling and review of medications and discharge plan. Signed: Thank you No primary care provider on file. for the opportunity to be involved in this patient's care.     Electronically signed by Darian Baum MD on 5/20/2020 at 2:13 PM

## 2020-05-20 NOTE — CARE COORDINATION
5/20/20, 1:20 PM EDT    DISCHARGE ON GOING EVALUATION    [address] day: 3  Location: -21/021-A Reason for admit: Chest pain [R07.9]  New onset seizure [facility]) [R56.9]   Procedure:   05/18  Left heart cath  05/19  EEG normal    Treatment Plan of Care: seizure post heart cath, neurology follows,  Barriers to Discharge: none    PCP: No primary care provider on file. Declines to establish a PCP      Readmission Risk Score: 9%  Patient Goals/Plan/Treatment Preferences: plans home with family; no needs. 5/20/20, 1:20 PM EDT    Patient goals/plan/ treatment preferences discussed by  and . Patient goals/plan/ treatment preferences reviewed with patient/ family. Patient/ family verbalize understanding of discharge plan and are in agreement with goal/plan/treatment preferences. Understanding was demonstrated using the teach back method. AVS provided by RN at time of discharge, which includes all necessary medical information pertaining to the patients current course of illness, treatment, post-discharge goals of care, and treatment preferences.

## 2020-05-20 NOTE — PLAN OF CARE
Problem: Falls - Risk of:  Goal: Will remain free from falls  Description: Will remain free from falls  5/20/2020 0137 by Ramesh Soliz RN  Outcome: Ongoing  Note: The patient has been free of falls this shift. Bed is in low position, 2/4 rails are up, and alarm is active. Call light is in reach, 2/4 rails are up, and hourly rounding performed. 5/19/2020 1712 by Anabella March RN  Outcome: Ongoing  Note: Call light within reach. Side rails up x2. Bed alarm on. Non skid slippers available. Goal: Absence of physical injury  Description: Absence of physical injury  Outcome: Ongoing     Problem: Safety:  Goal: Free from accidental physical injury  Description: Free from accidental physical injury  Outcome: Ongoing  Note: The patient has had no new seizure activity since the last seizure. Seizure precaution is in place and telesitter in room. Goal: Free from intentional harm  Description: Free from intentional harm  Outcome: Ongoing  Goal: Ability to remain free from injury will improve  Description: Ability to remain free from injury will improve  5/20/2020 0137 by Ramesh Soliz RN  Outcome: Ongoing  5/19/2020 1712 by Anabella March RN  Outcome: Ongoing  Note: Patient has had no seizures this shift. Will continue to monitor. Problem: Pain:  Goal: Patient's pain/discomfort is manageable  Description: Patient's pain/discomfort is manageable  5/20/2020 0137 by Ramesh Soliz RN  Outcome: Ongoing  Note: The patient did have some pain earlier d/t to injury from seizure. Mouthwash given to patient and he is satisfied at this time. 5/19/2020 1712 by Anabella March RN  Outcome: Ongoing  Note: Patient free from pain this shift. Pain rated on 0-10 pain rating scale. Will continue to reassess. Problem: Skin Integrity:  Goal: Skin integrity will stabilize  Description: Skin integrity will stabilize  Outcome: Ongoing  Note: No new skin breakdown this shift.      Problem: Discharge Planning:  Goal: